# Patient Record
Sex: MALE | Race: WHITE | HISPANIC OR LATINO | Employment: UNEMPLOYED | URBAN - METROPOLITAN AREA
[De-identification: names, ages, dates, MRNs, and addresses within clinical notes are randomized per-mention and may not be internally consistent; named-entity substitution may affect disease eponyms.]

---

## 2021-04-02 ENCOUNTER — HOSPITAL ENCOUNTER (EMERGENCY)
Facility: HOSPITAL | Age: 26
Discharge: HOME | End: 2021-04-02
Attending: EMERGENCY MEDICINE | Admitting: EMERGENCY MEDICINE

## 2021-04-02 VITALS
OXYGEN SATURATION: 100 % | RESPIRATION RATE: 16 BRPM | TEMPERATURE: 99.1 F | DIASTOLIC BLOOD PRESSURE: 73 MMHG | SYSTOLIC BLOOD PRESSURE: 133 MMHG | HEART RATE: 69 BPM

## 2021-04-02 DIAGNOSIS — N34.2 URETHRITIS: ICD-10-CM

## 2021-04-02 DIAGNOSIS — Z20.2 STD EXPOSURE: Primary | ICD-10-CM

## 2021-04-02 LAB
BACTERIA UR QL AUTO: ABNORMAL /HPF
BILIRUB UR QL STRIP: NEGATIVE
CLARITY UR: CLEAR
COLOR UR: YELLOW
GLUCOSE UR STRIP-MCNC: NEGATIVE MG/DL
HGB UR QL STRIP.AUTO: NEGATIVE
KETONES UR STRIP-MCNC: NEGATIVE MG/DL
LEUKOCYTE ESTERASE UR QL STRIP: ABNORMAL
NITRITE UR QL STRIP: NEGATIVE
NON-SQ EPI CELLS URNS QL MICRO: ABNORMAL /HPF
PH UR STRIP.AUTO: 5.5 [PH]
PROT UR STRIP-MCNC: NEGATIVE MG/DL
RBC #/AREA URNS AUTO: ABNORMAL /HPF
SP GR UR STRIP.AUTO: 1.02 (ref 1–1.03)
UROBILINOGEN UR QL STRIP.AUTO: 0.2 E.U./DL
WBC #/AREA URNS AUTO: ABNORMAL /HPF

## 2021-04-02 PROCEDURE — 99284 EMERGENCY DEPT VISIT MOD MDM: CPT | Performed by: PHYSICIAN ASSISTANT

## 2021-04-02 PROCEDURE — 87491 CHLMYD TRACH DNA AMP PROBE: CPT | Performed by: PHYSICIAN ASSISTANT

## 2021-04-02 PROCEDURE — 87591 N.GONORRHOEAE DNA AMP PROB: CPT | Performed by: PHYSICIAN ASSISTANT

## 2021-04-02 PROCEDURE — 99283 EMERGENCY DEPT VISIT LOW MDM: CPT

## 2021-04-02 PROCEDURE — 81001 URINALYSIS AUTO W/SCOPE: CPT | Performed by: PHYSICIAN ASSISTANT

## 2021-04-02 PROCEDURE — 96372 THER/PROPH/DIAG INJ SC/IM: CPT

## 2021-04-02 PROCEDURE — 87086 URINE CULTURE/COLONY COUNT: CPT | Performed by: PHYSICIAN ASSISTANT

## 2021-04-02 RX ORDER — VALACYCLOVIR HYDROCHLORIDE 1 G/1
1000 TABLET, FILM COATED ORAL 2 TIMES DAILY
Qty: 20 TABLET | Refills: 0 | Status: SHIPPED | OUTPATIENT
Start: 2021-04-02 | End: 2021-04-12

## 2021-04-02 RX ORDER — AZITHROMYCIN 250 MG/1
1000 TABLET, FILM COATED ORAL ONCE
Status: COMPLETED | OUTPATIENT
Start: 2021-04-02 | End: 2021-04-02

## 2021-04-02 RX ADMIN — CEFTRIAXONE 500 MG: 1 INJECTION, POWDER, FOR SOLUTION INTRAMUSCULAR; INTRAVENOUS at 15:28

## 2021-04-02 RX ADMIN — AZITHROMYCIN MONOHYDRATE 1000 MG: 250 TABLET ORAL at 15:26

## 2021-04-02 NOTE — ED PROVIDER NOTES
History  Chief Complaint   Patient presents with    Exposure to STD     Pt presents to the ED with c/o STD exposure  Pt c/o burning on urination, discharge  This is a 61-year-old male with no significant past medical history presenting to the emergency department today for potential STD exposure  The patient has noted increase in sensitivity to his glans penis and also white discharge and dysuria for approximately 2 weeks  The patient's sexual partner is also having symptoms similar to this  Patient does have unprotected sex with his partner and also with other partners  The patient also notes burning with ejaculation but with no hematuria  The patient denies any lesions on his glans penis or anywhere else on his genital region  The patient has also noted white discharge, especially that stains his underwear  The patient denies any systemic symptoms including fever, chills, chest pain, shortness of breath, suprapubic tenderness, etc   The patient denies other complaints at this time  History provided by:  Patient   used: No    Exposure to STD  Location:  Penis  Severity:  Moderate  Onset quality:  Sudden  Duration:  2 weeks  Timing:  Intermittent  Progression:  Worsening  Chronicity:  New  Context:  Patient had unprotected sex with significant other who was having STD-like symptoms  Associated symptoms: no abdominal pain, no chest pain, no congestion, no cough, no diarrhea, no fatigue, no fever, no headaches, no myalgias, no nausea, no rash, no shortness of breath, no vomiting and no wheezing    Risk factors:  MSM, Unprotected Sex, Multiple Partners      None       History reviewed  No pertinent past medical history  History reviewed  No pertinent surgical history  History reviewed  No pertinent family history  I have reviewed and agree with the history as documented      E-Cigarette/Vaping     E-Cigarette/Vaping Substances     Social History     Tobacco Use    Smoking status: Current Every Day Smoker     Types: Cigarettes    Smokeless tobacco: Never Used   Substance Use Topics    Alcohol use: Yes    Drug use: Not on file       Review of Systems   Constitutional: Negative for chills, fatigue and fever  HENT: Negative for congestion  Respiratory: Negative for cough, chest tightness, shortness of breath and wheezing  Cardiovascular: Negative for chest pain and palpitations  Gastrointestinal: Negative for abdominal pain, constipation, diarrhea, nausea and vomiting  Genitourinary: Positive for discharge, dysuria and penile pain  Negative for decreased urine volume, flank pain, frequency, genital sores, hematuria, penile swelling and testicular pain  Musculoskeletal: Negative for arthralgias and myalgias  Skin: Negative for color change and rash  Neurological: Negative for dizziness, seizures, syncope, weakness, light-headedness, numbness and headaches  Psychiatric/Behavioral: Negative for confusion  All other systems reviewed and are negative  Physical Exam  Physical Exam  Vitals signs and nursing note reviewed  Constitutional:       General: He is not in acute distress  Appearance: Normal appearance  He is normal weight  He is not ill-appearing, toxic-appearing or diaphoretic  HENT:      Head: Normocephalic and atraumatic  Nose: Nose normal       Mouth/Throat:      Mouth: Mucous membranes are moist    Eyes:      General: No scleral icterus  Right eye: No discharge  Left eye: No discharge  Extraocular Movements: Extraocular movements intact  Conjunctiva/sclera: Conjunctivae normal    Cardiovascular:      Rate and Rhythm: Normal rate and regular rhythm  Pulses: Normal pulses  Heart sounds: Normal heart sounds  No murmur  No friction rub  No gallop  Pulmonary:      Effort: Pulmonary effort is normal  No respiratory distress  Breath sounds: Normal breath sounds  No stridor   No wheezing, rhonchi or rales    Chest:      Chest wall: No tenderness  Abdominal:      Tenderness: There is no right CVA tenderness or left CVA tenderness  Comments: Soft, nontender, nondistended, and without organomegaly  No suprapubic tenderness to palpation   Genitourinary:     Scrotum/Testes: Normal       Comments: Patient with white discharge from his urethral meatus  No lesions on the patient's penis or genital region  No inguinal lymphadenopathy  No tenderness to palpation of testicles or scrotum  No lesions to suggest herpes or syphilis infection  Musculoskeletal: Normal range of motion  Right lower leg: No edema  Left lower leg: No edema  Skin:     General: Skin is warm and dry  Capillary Refill: Capillary refill takes less than 2 seconds  Coloration: Skin is not jaundiced or pale  Neurological:      General: No focal deficit present  Mental Status: He is alert and oriented to person, place, and time  Mental status is at baseline     Psychiatric:         Behavior: Behavior normal          Vital Signs  ED Triage Vitals [04/02/21 1411]   Temperature Pulse Respirations Blood Pressure SpO2   99 1 °F (37 3 °C) 69 16 133/73 100 %      Temp Source Heart Rate Source Patient Position - Orthostatic VS BP Location FiO2 (%)   Oral Monitor -- Left arm --      Pain Score       --           Vitals:    04/02/21 1411   BP: 133/73   Pulse: 69         Visual Acuity      ED Medications  Medications   azithromycin (ZITHROMAX) tablet 1,000 mg (1,000 mg Oral Given 4/2/21 1526)   cefTRIAXone (ROCEPHIN) 500 mg in lidocaine (PF) (XYLOCAINE-MPF) 1 % IM only syringe (500 mg Intramuscular Given 4/2/21 1528)       Diagnostic Studies  Results Reviewed     Procedure Component Value Units Date/Time    Urine Microscopic [[id number]]  (Abnormal) Collected: 04/02/21 1517    Lab Status: Final result Specimen: Urine, Clean Catch Updated: 04/02/21 1547     RBC, UA 2-4 /hpf      WBC, UA 10-20 /hpf      Epithelial Cells None Seen /hpf Bacteria, UA Occasional /hpf     Urine culture [[id number]] Collected: 04/02/21 1517    Lab Status: In process Specimen: Urine, Clean Catch Updated: 04/02/21 1547    UA w Reflex to Microscopic w Reflex to Culture [[id number]]  (Abnormal) Collected: 04/02/21 1517    Lab Status: Final result Specimen: Urine, Clean Catch Updated: 04/02/21 1539     Color, UA Yellow     Clarity, UA Clear     Specific [place], UA 1 020     pH, UA 5 5     Leukocytes, UA Small     Nitrite, UA Negative     Protein, UA Negative mg/dl      Glucose, UA Negative mg/dl      Ketones, UA Negative mg/dl      Urobilinogen, UA 0 2 E U /dl      Bilirubin, UA Negative     Blood, UA Negative    Chlamydia/GC amplified DNA by PCR [[id number]] Collected: 04/02/21 1517    Lab Status: In process Specimen: Urine, Other Updated: 04/02/21 1525                 No orders to display              Procedures  Procedures         ED Course                                           MDM  Number of Diagnoses or Management Options  STD exposure: new and requires workup  Urethritis: new and requires workup  Diagnosis management comments: This is a 40-year-old male presenting to the emergency department with his partner with potential STD exposure  The patient has unprotected sex with multiple partners and with his partner who is also having symptoms  Patient denies any systemic symptoms  Urinalysis significant for small leukocytes  GC chlamydia sent by a urine sample  The patient was given 500 mg of IM Rocephin and 1 g of azithromycin here in the emergency department  Given that the patient's partner is having active symptoms of herpes genitalis, I sent the patient Valtrex therapy to his pharmacy  The patient is stable for discharge at this time  Recommend the patient take the medications as prescribed for them as prescribed    Also recommended the patient follow up with the [facility] and or with his family practice physician for follow-up of today's visit and for likely need for further testing and test of cure  Strict return precautions given including chest pain, shortness of breath, dizziness, lightheadedness, palpitations, flank pain, significant abdominal pain, significant penile discharge, etc   The patient has partner verify their understanding and agreed to the plan at this time  All questions answered to the patient and his partners satisfaction  Amount and/or Complexity of Data Reviewed  Clinical lab tests: ordered and reviewed  Discuss the patient with other providers: yes  Independent visualization of images, tracings, or specimens: yes    Patient Progress  Patient progress: stable      Disposition  Final diagnoses:   STD exposure   Urethritis     Time reflects when diagnosis was documented in both MDM as applicable and the Disposition within this note     Time User Action Codes Description Comment    4/2/2021  3:52 PM [address] [Z20 2] STD exposure     4/2/2021  3:52 PM [address] [N34 2] Urethritis       ED Disposition     ED Disposition Condition Date/Time Comment    Discharge Stable Fri Apr 2, 2021  3:53 PM Cullen Valdovinos discharge to home/self care  Follow-up Information    None         Discharge Medication List as of 4/2/2021  3:54 PM      START taking these medications    Details   valACYclovir (VALTREX) 1,000 mg tablet Take 1 tablet (1,000 mg total) by mouth 2 (two) times a day for 10 days, Starting Fri 4/2/2021, Until Mon 4/12/2021, Normal           No discharge procedures on file      PDMP Review     None          ED Provider  Electronically Signed by           Brandy Isbell PA-C  04/02/21 0290

## 2021-04-02 NOTE — DISCHARGE INSTRUCTIONS
[facility] [address], [address] floor, [id number] [place] [address], [facility] hospital  Phone Number - [phone]    Above his information for the [facility], a clinic that will be helpful an management of your current STD exposure  Please call them for an appointment at your earliest convenience  He will need follow-up testing and blood results for the STD exposure that you have been treated for today  I recommend following up with the family practice physician at your earliest convenience  Please refrain from sexual activity until symptom-free and cleared from a primary care physician or a physician at the [facility]  I am sending you an antiviral medication for genital herpes  Please take the medication as prescribed  Please return to the emergency department for any worsening symptoms including chest pain, shortness of breath, palpitations, lightheadedness, dizziness, severe pain with urination, severe rash to genitals, fever, etc   I recommend safe sex practices  You have been treated for gonorrhea and chlamydia here in the emergency department  Please follow-up with the [facility] or primary care physician after this visit for further testing and follow-up

## 2021-04-03 LAB
BACTERIA UR CULT: NORMAL
C TRACH DNA SPEC QL NAA+PROBE: POSITIVE
N GONORRHOEA DNA SPEC QL NAA+PROBE: NEGATIVE

## 2021-04-03 NOTE — ED ATTENDING ATTESTATION
4/2/2021  IMary Beth MD, saw and evaluated the patient  I have discussed the patient with the resident/non-physician practitioner and agree with the resident's/non-physician practitioner's findings, Plan of Care, and MDM as documented in the resident's/non-physician practitioner's note, except where noted  All available labs and Radiology studies were reviewed  I was present for key portions of any procedure(s) performed by the resident/non-physician practitioner and I was immediately available to provide assistance  At this point I agree with the current assessment done in the Emergency Department    I have conducted an independent evaluation of this patient a history and physical is as follows:    ED Course         Critical Care Time  Procedures

## 2021-04-05 NOTE — RESULT ENCOUNTER NOTE
Patient called at [phone] and [phone]  I was unsuccessful at reaching the patient at either number  No voicemail to leave message  Will need to send letter to home